# Patient Record
(demographics unavailable — no encounter records)

---

## 2024-10-14 NOTE — PHYSICAL EXAM
[NL] : no acute distress, alert [EOMI] : grossly EOMI [Clear] : right tympanic membrane clear [Pink Nasal Mucosa] : pink nasal mucosa [Erythematous Oropharynx] : nonerythematous oropharynx [Clear to Auscultation Bilaterally] : clear to auscultation bilaterally [Regular Rate and Rhythm] : regular rate and rhythm [FreeTextEntry2] : SOFT SWELLING RIGHT FOREHEAD NO CREPITUS  [FreeTextEntry5] : NO NYSTAGMUS  + SWOLLEN BRUISED SKIN BILATERAL LOWER EYELIDS [de-identified] : TONGUE MIDLINE [de-identified] : BILATERAL SHINERS

## 2024-10-14 NOTE — HISTORY OF PRESENT ILLNESS
[FreeTextEntry6] : KNOWN PT FEEL AT SCHOOL LAST MONDAY NO LOC NO VOMITING SEEN BY DR. QUINTANILLA PLASTIC SUREGERY  + DISSOLVALBE SUTURES PLACED WITH A STERISTRIP OVER THE TOP  SWELLING OVER BOTH HIS EYES, NO VISION CHANGES NO HA ON CEPHALEXIN RX ON SATURDAY  SEEN AT [facility] ? IMAGING ? CELLULITIS  AVOIDED IMAGING  SUPPOSED TO REMOVE THE STERI STRIP TODAY

## 2024-10-14 NOTE — COUNSELING
[Use of Plain Language] : use of plain language [Needs Reinforcement, Referred] : needs reinforcement, referred verbal instruction

## 2024-10-14 NOTE — DISCUSSION/SUMMARY
[FreeTextEntry1] : HEALING LACERATION UNABLE TO REMOVE THE STERISTRIP SAFELY TODAY ADVISED GENTLE MASSAGE WITH VASELINE TO REMOVE SCAR PROTECTION DISCUSSED  ? DEPENDENT TRACKING HEMATOMA  CONT CEFALEXIN PER PLASTICS FOLLOW UP NEXT WEEK

## 2024-10-25 NOTE — DISCUSSION/SUMMARY
[FreeTextEntry1] : HATTIE presents today with facial injury follow up. He has taken his antibiotics. No recent swelling. His skin has started to heal.

## 2024-10-25 NOTE — PHYSICAL EXAM
[Clear to Auscultation Bilaterally] : clear to auscultation bilaterally [Regular Rate and Rhythm] : regular rate and rhythm [Acute Distress] : no acute distress [Pink Nasal Mucosa] : nasal mucosa not pink [Erythematous Oropharynx] : nonerythematous oropharynx [Transmitted Upper Airway Sounds] : no transmitted upper airway sounds [Subcostal Retractions] : no subcostal retractions

## 2024-11-18 NOTE — PHYSICAL EXAM
[Acute Distress] : no acute distress [Alert] : alert [Tenderness] : no tenderness [EOMI] : grossly EOMI [Clear] : right tympanic membrane clear [Pink Nasal Mucosa] : nasal mucosa not pink [Erythematous Oropharynx] : nonerythematous oropharynx [de-identified] : Scatted blanchable papules on lower legs

## 2024-11-18 NOTE — HISTORY OF PRESENT ILLNESS
[FreeTextEntry6] : HATTIE presents today with a rash and vomiting. He has not had a fever. He did have a beef burger over the weekend.

## 2024-11-18 NOTE — PHYSICAL EXAM
[Acute Distress] : no acute distress [Alert] : alert [Tenderness] : no tenderness [EOMI] : grossly EOMI [Clear] : right tympanic membrane clear [Pink Nasal Mucosa] : nasal mucosa not pink [Erythematous Oropharynx] : nonerythematous oropharynx [de-identified] : Scatted blanchable papules on lower legs

## 2024-11-18 NOTE — DISCUSSION/SUMMARY
[FreeTextEntry1] : HATTIE presents today with abdominal pain, rash and diarrhea. Differentials include Gastroenteritis or HSP and will have a urine sample checked.

## 2024-11-20 NOTE — PHYSICAL EXAM
[Alert] : alert [EOMI] : grossly EOMI [Clear] : right tympanic membrane clear [Acute Distress] : no acute distress [Tenderness] : no tenderness [Pink Nasal Mucosa] : nasal mucosa not pink [Erythematous Oropharynx] : nonerythematous oropharynx [de-identified] : Scatted blanchable papules on lower legs

## 2024-11-20 NOTE — DISCUSSION/SUMMARY
Left message for patient going over lab results and MD message.    [FreeTextEntry1] : HATTIE presents today with concern for HSP and will have a repeat urine done

## 2024-11-20 NOTE — HISTORY OF PRESENT ILLNESS
[FreeTextEntry6] : HATTIE presents today with follow up for a lower body rash with concerns for HSP. He has been feeling better and has not had any episodes of diarrhea.

## 2024-11-20 NOTE — PHYSICAL EXAM
[Alert] : alert [EOMI] : grossly EOMI [Clear] : right tympanic membrane clear [Acute Distress] : no acute distress [Tenderness] : no tenderness [Pink Nasal Mucosa] : nasal mucosa not pink [Erythematous Oropharynx] : nonerythematous oropharynx [de-identified] : Scatted blanchable papules on lower legs

## 2024-12-18 NOTE — PHYSICAL EXAM
[Alert] : alert [No Acute Distress] : no acute distress [Normocephalic] : normocephalic [Conjunctivae with no discharge] : conjunctivae with no discharge [PERRL] : PERRL [EOMI Bilateral] : EOMI bilateral [Auricles Well Formed] : auricles well formed [Clear Tympanic membranes with present light reflex and bony landmarks] : clear tympanic membranes with present light reflex and bony landmarks [No Discharge] : no discharge [Nares Patent] : nares patent [Pink Nasal Mucosa] : pink nasal mucosa [Palate Intact] : palate intact [Nonerythematous Oropharynx] : nonerythematous oropharynx [Supple, full passive range of motion] : supple, full passive range of motion [No Palpable Masses] : no palpable masses [Symmetric Chest Rise] : symmetric chest rise [Clear to Auscultation Bilaterally] : clear to auscultation bilaterally [Regular Rate and Rhythm] : regular rate and rhythm [Normal S1, S2 present] : normal S1, S2 present [No Murmurs] : no murmurs posterior [+2 Femoral Pulses] : +2 femoral pulses [Soft] : soft [NonTender] : non tender [Non Distended] : non distended [Normoactive Bowel Sounds] : normoactive bowel sounds [No Hepatomegaly] : no hepatomegaly [No Splenomegaly] : no splenomegaly [Testicles Descended Bilaterally] : testicles descended bilaterally [Patent] : patent [No fissures] : no fissures [No Abnormal Lymph Nodes Palpated] : no abnormal lymph nodes palpated [No Gait Asymmetry] : no gait asymmetry [No pain or deformities with palpation of bone, muscles, joints] : no pain or deformities with palpation of bone, muscles, joints [Normal Muscle Tone] : normal muscle tone [Straight] : straight [+2 Patella DTR] : +2 patella DTR [Cranial Nerves Grossly Intact] : cranial nerves grossly intact [FreeTextEntry1] : Facial scar  [de-identified] : Scattered mollucscum on buttocks

## 2024-12-18 NOTE — PHYSICAL EXAM
[Alert] : alert [No Acute Distress] : no acute distress [Normocephalic] : normocephalic [Conjunctivae with no discharge] : conjunctivae with no discharge [PERRL] : PERRL [EOMI Bilateral] : EOMI bilateral [Auricles Well Formed] : auricles well formed [Clear Tympanic membranes with present light reflex and bony landmarks] : clear tympanic membranes with present light reflex and bony landmarks [No Discharge] : no discharge [Nares Patent] : nares patent [Pink Nasal Mucosa] : pink nasal mucosa [Palate Intact] : palate intact [Nonerythematous Oropharynx] : nonerythematous oropharynx [Supple, full passive range of motion] : supple, full passive range of motion [No Palpable Masses] : no palpable masses [Symmetric Chest Rise] : symmetric chest rise [Clear to Auscultation Bilaterally] : clear to auscultation bilaterally [Regular Rate and Rhythm] : regular rate and rhythm [Normal S1, S2 present] : normal S1, S2 present [No Murmurs] : no murmurs [+2 Femoral Pulses] : +2 femoral pulses [Soft] : soft [NonTender] : non tender [Non Distended] : non distended [Normoactive Bowel Sounds] : normoactive bowel sounds [No Hepatomegaly] : no hepatomegaly [No Splenomegaly] : no splenomegaly [Testicles Descended Bilaterally] : testicles descended bilaterally [Patent] : patent [No fissures] : no fissures [No Abnormal Lymph Nodes Palpated] : no abnormal lymph nodes palpated [No Gait Asymmetry] : no gait asymmetry [No pain or deformities with palpation of bone, muscles, joints] : no pain or deformities with palpation of bone, muscles, joints [Normal Muscle Tone] : normal muscle tone [Straight] : straight [+2 Patella DTR] : +2 patella DTR [Cranial Nerves Grossly Intact] : cranial nerves grossly intact [FreeTextEntry1] : Facial scar  [de-identified] : Scattered mollucscum on buttocks

## 2024-12-18 NOTE — PHYSICAL EXAM
[Alert] : alert [No Acute Distress] : no acute distress [Normocephalic] : normocephalic [Conjunctivae with no discharge] : conjunctivae with no discharge [PERRL] : PERRL [EOMI Bilateral] : EOMI bilateral [Auricles Well Formed] : auricles well formed [Clear Tympanic membranes with present light reflex and bony landmarks] : clear tympanic membranes with present light reflex and bony landmarks [No Discharge] : no discharge [Nares Patent] : nares patent [Pink Nasal Mucosa] : pink nasal mucosa [Palate Intact] : palate intact [Nonerythematous Oropharynx] : nonerythematous oropharynx [Supple, full passive range of motion] : supple, full passive range of motion [No Palpable Masses] : no palpable masses [Symmetric Chest Rise] : symmetric chest rise [Clear to Auscultation Bilaterally] : clear to auscultation bilaterally [Regular Rate and Rhythm] : regular rate and rhythm [Normal S1, S2 present] : normal S1, S2 present [No Murmurs] : no murmurs [+2 Femoral Pulses] : +2 femoral pulses [Soft] : soft [NonTender] : non tender [Non Distended] : non distended [Normoactive Bowel Sounds] : normoactive bowel sounds [No Hepatomegaly] : no hepatomegaly [No Splenomegaly] : no splenomegaly [Testicles Descended Bilaterally] : testicles descended bilaterally [Patent] : patent [No fissures] : no fissures [No Abnormal Lymph Nodes Palpated] : no abnormal lymph nodes palpated [No Gait Asymmetry] : no gait asymmetry [No pain or deformities with palpation of bone, muscles, joints] : no pain or deformities with palpation of bone, muscles, joints [Normal Muscle Tone] : normal muscle tone [Straight] : straight [+2 Patella DTR] : +2 patella DTR [Cranial Nerves Grossly Intact] : cranial nerves grossly intact [FreeTextEntry1] : Facial scar  [de-identified] : Scattered mollucscum on buttocks

## 2024-12-18 NOTE — HISTORY OF PRESENT ILLNESS
[FreeTextEntry1] : 6 year well  First grade Stays active with Soccer and Basketball Regular stools Tonsillar surgery this past year  Head injury that required stiches Athlete foot Molluscum: Has seen derm for treatment, and Skin has has improved

## 2025-01-17 NOTE — DISCUSSION/SUMMARY
[FreeTextEntry1] : Healed skin infection on chest. Molluscum lesions, uninfected, to legs/buttock - await spontaneous resolution.

## 2025-01-17 NOTE — PHYSICAL EXAM
[Alert] : alert [EOMI] : grossly EOMI [Clear] : right tympanic membrane clear [Pink Nasal Mucosa] : pink nasal mucosa [Supple] : supple [FROM] : full passive range of motion [Symmetric Chest Wall] : symmetric chest wall [Clear to Auscultation Bilaterally] : clear to auscultation bilaterally [NL] : no abnormal lymph nodes palpated [Acute Distress] : no acute distress [Tenderness] : no tenderness [Erythematous Oropharynx] : nonerythematous oropharynx [Enlarged Tonsils] : tonsils not enlarged [FreeTextEntry1] : T97.7F [de-identified] : No infected molluscum lesions seen to trunk. Some non-erythematous molluscum lesions seen to back of legs and on buttocks.

## 2025-01-17 NOTE — HISTORY OF PRESENT ILLNESS
[de-identified] : Molluscum infected, follow up; slight cough [FreeTextEntry6] : He had some infected molluscum treated with topical Gentamycin. The infected lesions were along the right side of his chest. Mom thinks they are all healed. Also he has a slight cough and he recently complained that his righrt ear hurt. No fever. He remains active and is eating and drinking well.  This office is his medical home.

## 2025-04-14 NOTE — PHYSICAL EXAM
[Acute Distress] : no acute distress [Alert] : alert [Tenderness] : no tenderness [Clear] : right tympanic membrane clear [Pink Nasal Mucosa] : nasal mucosa not pink [Erythematous Oropharynx] : erythematous oropharynx [Inflamed Gingiva] : gingiva not inflamed [Enlarged Tonsils] : enlarged tonsils [Vesicles] : no vesicles [Clear to Auscultation Bilaterally] : clear to auscultation bilaterally [Transmitted Upper Airway Sounds] : no transmitted upper airway sounds [Subcostal Retractions] : no subcostal retractions [Regular Rate and Rhythm] : regular rate and rhythm

## 2025-04-14 NOTE — DISCUSSION/SUMMARY
[FreeTextEntry1] : HATTIE SHOEMAKER  presents today with acute strep throat, POCT was positive. Will treat with Amoxicillin. Instructed to follow up in 3 days if symptoms dont improve

## 2025-06-25 NOTE — CURRENT MEDS
[Patient/caregiver able to verbalize understanding of medications, including indications and side effects] : Patient/caregiver able to verbalize understanding of medications, including indications and side effects .

## 2025-06-25 NOTE — PHYSICAL EXAM
[NL] : no acute distress, alert [Clear to Auscultation Bilaterally] : clear to auscultation bilaterally [Regular Rate and Rhythm] : regular rate and rhythm [Soft] : soft [Daniel: ____] : Daniel [unfilled] [Circumcised] : circumcised [Retractile Testicle] : retractile testicle [Bilateral] : (bilateral) [Warm, Well Perfused x4] : warm, well perfused x4 [Erythematous Oropharynx] : nonerythematous oropharynx [Undescended Testicle] : descended testicle(s) [de-identified] : MULTIPLE BUG BITES

## 2025-06-25 NOTE — DISCUSSION/SUMMARY
[FreeTextEntry1] : DISCUSSED RETRACTILE TESTES ARE A NORMAL VARIANT IN PREPUBERTAL BOYS BUT SHOULD BE MONITORED AND MAY REQUIRE SURGERY IF PERSISTS REFERRED FOR UROLOGY CONSULTATION

## 2025-07-10 NOTE — HISTORY OF PRESENT ILLNESS
[de-identified] : MUSHY BEHAVIOR X 1 WEEK  [FreeTextEntry6] : ESTABLISHED PT LOW BEHAVIOR, MORE SLEEPY X 5 DAYS LARGE SORE ON THE LEFT SIDE OF THE TONGUE MAKING IT DIFFICULT TO EAT LOW GRADE TEMP X 1 DAY  SEEN BY UROLOGY - RECOMMEND OBSERVING RETRACTILE TESTILES UNTIL PUBERTY

## 2025-07-10 NOTE — PHYSICAL EXAM
[NL] : pink nasal mucosa [Erythematous Oropharynx] : erythematous oropharynx [Vesicles] : vesicles present [Exudate] : no exudate [Supple] : supple [Clear to Auscultation Bilaterally] : clear to auscultation bilaterally [Normal S1, S2 audible] : normal S1, S2 audible [Soft] : soft [No Abnormal Lymph Nodes Palpated] : no abnormal lymph nodes palpated [Warm, Well Perfused x4] : warm, well perfused x4 [de-identified] : SEVERE ERYTHEMA  LARGE 2CM SHALLOW WHITE ULCER ON THE LEFT SIDE OF THE TONGUE [de-identified] : NO RASH

## 2025-07-10 NOTE — DISCUSSION/SUMMARY
[FreeTextEntry1] : PHARYNGITIS RAPID STREP NEGATIVE, CULTURE SENT SUPPORTIVE CARE SALT WATER GARGLES, SOFT FOODS MONITOR FOR RASH, MEGHANN ON THE PALMS OR BOTTOM OF THE FEET